# Patient Record
Sex: MALE | Race: WHITE | NOT HISPANIC OR LATINO | URBAN - METROPOLITAN AREA
[De-identification: names, ages, dates, MRNs, and addresses within clinical notes are randomized per-mention and may not be internally consistent; named-entity substitution may affect disease eponyms.]

---

## 2017-09-28 ENCOUNTER — EMERGENCY (EMERGENCY)
Facility: HOSPITAL | Age: 22
LOS: 1 days | End: 2017-09-28
Payer: MEDICAID

## 2017-09-28 PROCEDURE — 99285 EMERGENCY DEPT VISIT HI MDM: CPT

## 2017-09-28 PROCEDURE — 71020: CPT | Mod: 26

## 2017-09-29 PROCEDURE — 71250 CT THORAX DX C-: CPT | Mod: 26

## 2024-01-12 ENCOUNTER — APPOINTMENT (OUTPATIENT)
Dept: NEUROLOGY | Facility: CLINIC | Age: 29
End: 2024-01-12
Payer: COMMERCIAL

## 2024-01-12 VITALS
SYSTOLIC BLOOD PRESSURE: 118 MMHG | DIASTOLIC BLOOD PRESSURE: 83 MMHG | BODY MASS INDEX: 29.82 KG/M2 | HEIGHT: 67 IN | WEIGHT: 190 LBS | HEART RATE: 78 BPM

## 2024-01-12 DIAGNOSIS — M54.12 RADICULOPATHY, CERVICAL REGION: ICD-10-CM

## 2024-01-12 DIAGNOSIS — G95.9 DISEASE OF SPINAL CORD, UNSPECIFIED: ICD-10-CM

## 2024-01-12 DIAGNOSIS — J45.20 MILD INTERMITTENT ASTHMA, UNCOMPLICATED: ICD-10-CM

## 2024-01-12 DIAGNOSIS — Z82.0 FAMILY HISTORY OF EPILEPSY AND OTHER DISEASES OF THE NERVOUS SYSTEM: ICD-10-CM

## 2024-01-12 DIAGNOSIS — Z78.9 OTHER SPECIFIED HEALTH STATUS: ICD-10-CM

## 2024-01-12 DIAGNOSIS — G37.9 DEMYELINATING DISEASE OF CENTRAL NERVOUS SYSTEM, UNSPECIFIED: ICD-10-CM

## 2024-01-12 DIAGNOSIS — Z86.19 PERSONAL HISTORY OF OTHER INFECTIOUS AND PARASITIC DISEASES: ICD-10-CM

## 2024-01-12 PROBLEM — Z00.00 ENCOUNTER FOR PREVENTIVE HEALTH EXAMINATION: Status: ACTIVE | Noted: 2024-01-12

## 2024-01-12 PROCEDURE — 99205 OFFICE O/P NEW HI 60 MIN: CPT

## 2024-01-12 NOTE — PHYSICAL EXAM
[FreeTextEntry1] : His height is 5 feet 7 inches weight is 190 pounds and is left-handed.  Head neck, ear nose and throat is unremarkable.  There is no carotid bruit, thyromegaly or lymphadenopathy.  The entire course of carotid and vertebral artery area palpation was without tenderness and there is no neck muscle spasm.  Eardrums are clear mastoids are not tender and temporal artery pulsations are normal.  Cervical spine range of motion revealed mild pain in extension and left lateral rotation in the C5-C6 and C6-C7 paraspinal area without any foraminal compression test positivity and no suboccipital notch tenderness.  His radial pulsations were normal and Homans sign was negative including Adson's hyperabduction maneuver which was also negative cough was not tender pedal pulsations were normal.  Neurological examination is completely normal except for minimal +4/5 strength of the left deltoid biceps and tricep muscle and rest of his examination was completely normal.  Memory language cognitive and behavioral functions were normal.  There is no Kirstie syndrome facial sensations are preserved optic disks are normal without any pallor or papilledema no afferent pupillary defect extraocular movements are full without nystagmus visual fields are normal in all 4 quadrants and there was no facial weakness hearing was normal bulbar functions were intact head tilted did not reveal any nystagmus.  Neck muscles were 5/5.  The patient's muscle tone is normal in both upper and lower extremity with all other muscle groups revealing normal 5/5 strength without atrophy fasciculation or abnormal movements however his deep tendon reflexes were symmetric 3+ throughout without any Babinski sign and abdominals were present.  He was able to toe heel and tandem walk without any difficulty and Romberg sign was negative.  Finger-to-nose and heel-to-shin testing was normal thus his entire neurological evaluation was otherwise unremarkable.

## 2024-01-12 NOTE — DATA REVIEWED
[de-identified] : I reviewed extensive medical reports that the patient brought to my office and noted his emergency room records where he was thoroughly evaluated with a normal EKG blood chemistries CBC cardiac rhythm checkup and normal chest x-ray and also noted the evaluation by his cardiologist yesterday and he also found normal EKG and reviewed his entire physical examination from the emergency room and declared him that he has no cardiac disorder and advised him neurological consultation.

## 2024-01-12 NOTE — HISTORY OF PRESENT ILLNESS
[FreeTextEntry1] : Mr. Jean is a 28-year-old born in 1995 requested urgent neurological consultation and was advised to come fasting in the morning to my office and he arrived at 7:30 AM with his aunt and narrated the following history  On January 8 there is approximately 4 days back he woke up around 8:00 in the morning and was well until he started working on his desk at home and suddenly felt sweaty lightheaded and drank plenty of water to avoid any possibility of dehydration but 5 minutes later he developed left anterior chest wall pain in the infraclavicular area which was sharp and moderate lasted for about 10 minutes and approximately 5 minutes later developed left upper extremity shoulder arm pain and numbness and cold feeling and also had a transient numbish feeling in the left foot which may have lasted for about 10 minutes.  He denied any other symptoms during the.  And call an ambulance and was evaluated at [facility] where he had total of 3 electrocardiograms were in the ambulance and 2 in the emergency room including cardiac monitoring extensive blood work x-ray of the chest and everything reportedly turned out to be normal was also hydrated with IV fluids and discharged to be seen by a cardiologist.  He was evaluated yesterday that is on 1/11/2024 by a cardiologist and complained of left upper extremity weakness whereas all other symptoms have largely subsided except for numbish feeling in the arm on the left and had an EKG and a thorough cardiac evaluation and was advised that all the blood work and everything else was negative and advised urgent neurological consultation.  His current complaints thus consist of heavy feeling in the left arm and everything else has subsided except for numbish feeling in the shoulder or arm area with some neck discomfort on the left but denied any memory language cognitive or behavioral dysfunction and denied any diplopia or visual obscurations dysarthria or dysphagia or dyspnea tinnitus hearing loss vertigo focal or lateralized weakness elsewhere bowel or bladder dysfunction ataxia significant neck back or thoracic pain and there is no chest pain shortness of breath palpitation and clearly indicated that he had no visual obscurations of any kind during the episode and never had the symptoms prior to 1/8/2024.  Past medical history is only pertinent for activity induced asthma and uses occasional inhalers and had Lyme disease with antibody +5 years ago and was treated with antibiotics and never suffered any consequences or sequela I.  There is no documented cardiac respiratory or GI disease renal dysfunction diabetes hypertension arthritic disease depression or anxiety.  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  He is currently not taking any medications.

## 2024-01-12 NOTE — DISCUSSION/SUMMARY
[FreeTextEntry1] : Opinion-the patient had an episode which is not clearly characteristic of either TIA or stroke but raised his suspicion based on his history and examination of possible cervical radiculopathy with perhaps subtle myelopathy but I cannot totally exclude the possibility of paroxysmal cardiac arrhythmia which was ruled out by monitoring but I suggested that he should have an echocardiogram and I will contact his cardiologist for the same.  Because of diagnostic uncertainty I advised him to obtain MRI of the cervical spine and brain to rule out any demyelinating process or silent small stroke and if it is negative I will advise him neurological follow-up and further time being he should take a baby aspirin and return back to cardiologist for echocardiogram.  Extensive education and counseling was provided in the presence of his aunt Ms. Alberta Deluca and they expressed understanding and will proceed with my advice.  He has my personal phone numbers and was advised to contact me if there is any change and if the episode repeats he should rest to the emergency room right away.  He expressed understanding.

## 2024-01-24 ENCOUNTER — APPOINTMENT (OUTPATIENT)
Dept: MRI IMAGING | Facility: CLINIC | Age: 29
End: 2024-01-24